# Patient Record
Sex: FEMALE | Race: WHITE | NOT HISPANIC OR LATINO | Employment: FULL TIME | URBAN - METROPOLITAN AREA
[De-identification: names, ages, dates, MRNs, and addresses within clinical notes are randomized per-mention and may not be internally consistent; named-entity substitution may affect disease eponyms.]

---

## 2023-07-25 ENCOUNTER — OFFICE VISIT (OUTPATIENT)
Dept: PRIMARY CARE | Facility: CLINIC | Age: 18
End: 2023-07-25
Payer: COMMERCIAL

## 2023-07-25 VITALS
HEIGHT: 65 IN | WEIGHT: 195.9 LBS | BODY MASS INDEX: 32.64 KG/M2 | SYSTOLIC BLOOD PRESSURE: 120 MMHG | RESPIRATION RATE: 16 BRPM | DIASTOLIC BLOOD PRESSURE: 86 MMHG | TEMPERATURE: 97.3 F | HEART RATE: 82 BPM

## 2023-07-25 DIAGNOSIS — R10.2 VAGINAL PAIN: Primary | ICD-10-CM

## 2023-07-25 PROCEDURE — 99213 OFFICE O/P EST LOW 20 MIN: CPT | Performed by: FAMILY MEDICINE

## 2023-07-25 PROCEDURE — 1036F TOBACCO NON-USER: CPT | Performed by: FAMILY MEDICINE

## 2023-07-25 ASSESSMENT — PATIENT HEALTH QUESTIONNAIRE - PHQ9
2. FEELING DOWN, DEPRESSED OR HOPELESS: NOT AT ALL
SUM OF ALL RESPONSES TO PHQ9 QUESTIONS 1 & 2: 0
1. LITTLE INTEREST OR PLEASURE IN DOING THINGS: NOT AT ALL

## 2023-07-25 NOTE — PROGRESS NOTES
"Subjective   Patient ID: Renetta Reece is a 17 y.o. female who presents for Vaginal Pain ([employer]pro[employer] 1 year white to clear occasional discharge).    Lower portion of vaginal has heaviness   Menses started yesterday   feels tampons   one finger uncomfortable  two cannot tolerable   Never sexually active    Tampons uncomfortable   feels tight pressure in the opening   menses started yesterday and has tampon in   Some discharge after menses but otherwise not much          Review of Systems    Objective   /86   Pulse 82   Temp 36.3 °C (97.3 °F) (Temporal)   Resp 16   Ht 1.651 m (5' 5\")   Wt 88.9 kg (195 lb 14.4 oz)   BMI 32.60 kg/m²     Physical Exam  Genitourinary:         Comments: Tenderness but unable to see well due to menses         Assessment/Plan   Problem List Items Addressed This Visit    None  Visit Diagnoses       Vaginal pain    -  Primary    Relevant Orders    Referral to Gynecology               "

## 2023-08-28 ENCOUNTER — OFFICE VISIT (OUTPATIENT)
Dept: PRIMARY CARE | Facility: CLINIC | Age: 18
End: 2023-08-28
Payer: COMMERCIAL

## 2023-08-28 VITALS
HEIGHT: 65 IN | BODY MASS INDEX: 32.19 KG/M2 | TEMPERATURE: 97.5 F | RESPIRATION RATE: 16 BRPM | HEART RATE: 76 BPM | WEIGHT: 193.2 LBS | DIASTOLIC BLOOD PRESSURE: 76 MMHG | SYSTOLIC BLOOD PRESSURE: 122 MMHG

## 2023-08-28 DIAGNOSIS — F41.9 ANXIETY: Primary | ICD-10-CM

## 2023-08-28 PROCEDURE — 99213 OFFICE O/P EST LOW 20 MIN: CPT | Performed by: FAMILY MEDICINE

## 2023-08-28 PROCEDURE — 1036F TOBACCO NON-USER: CPT | Performed by: FAMILY MEDICINE

## 2023-08-28 RX ORDER — ESCITALOPRAM OXALATE 5 MG/1
5 TABLET ORAL DAILY
Qty: 30 TABLET | Refills: 2 | Status: SHIPPED | OUTPATIENT
Start: 2023-08-28 | End: 2023-09-29 | Stop reason: SDUPTHER

## 2023-08-28 RX ORDER — NORGESTIMATE AND ETHINYL ESTRADIOL 7DAYSX3 LO
1 KIT ORAL DAILY
COMMUNITY
Start: 2020-02-27

## 2023-08-28 NOTE — PROGRESS NOTES
"Subjective   Patient ID: Renetta Reece is a 17 y.o. female who presents for Anxiety (Patient wants to discuss starting medication for her Anxiety. Patient states the anxiety is getting worse. ).    Has had anxiety since very young   When 5 yo took medication on top of ADHD medication study   Intuiv (guanfacine)  Stopped it at 10 yo due to stomach issues  Mom on Lexapro and doing well   Puts off doing thing because of being afraid          Review of Systems    Objective   /76   Pulse 76   Temp 36.4 °C (97.5 °F)   Resp 16   Ht 1.651 m (5' 5\")   Wt 87.6 kg (193 lb 3.2 oz)   BMI 32.15 kg/m²     Physical Exam  Vitals and nursing note reviewed.   Constitutional:       General: She is not in acute distress.     Appearance: She is not ill-appearing.   HENT:      Head: Normocephalic and atraumatic.      Mouth/Throat:      Mouth: Mucous membranes are moist.   Eyes:      Conjunctiva/sclera: Conjunctivae normal.   Cardiovascular:      Rate and Rhythm: Normal rate and regular rhythm.      Heart sounds: Normal heart sounds.   Pulmonary:      Effort: Pulmonary effort is normal.      Breath sounds: Normal breath sounds.   Skin:     General: Skin is warm.   Neurological:      Mental Status: She is alert.   Psychiatric:         Mood and Affect: Mood normal.         Thought Content: Thought content normal.         Judgment: Judgment normal.         Assessment/Plan   Problem List Items Addressed This Visit    None  Visit Diagnoses       Anxiety    -  Primary    Relevant Medications    escitalopram (Lexapro) 5 mg tablet    Other Relevant Orders    Follow Up In [facility] - PCP - Established               "

## 2023-09-29 ENCOUNTER — TELEMEDICINE (OUTPATIENT)
Dept: PRIMARY CARE | Facility: CLINIC | Age: 18
End: 2023-09-29
Payer: COMMERCIAL

## 2023-09-29 DIAGNOSIS — F41.9 ANXIETY: ICD-10-CM

## 2023-09-29 PROCEDURE — 99213 OFFICE O/P EST LOW 20 MIN: CPT | Performed by: FAMILY MEDICINE

## 2023-09-29 RX ORDER — ESCITALOPRAM OXALATE 5 MG/1
5 TABLET ORAL DAILY
Qty: 30 TABLET | Refills: 5 | Status: SHIPPED | OUTPATIENT
Start: 2023-09-29 | End: 2023-12-06 | Stop reason: SDUPTHER

## 2023-09-29 ASSESSMENT — ENCOUNTER SYMPTOMS
DEPRESSED MOOD: 0
NERVOUS/ANXIOUS: 0

## 2023-09-29 NOTE — PROGRESS NOTES
Subjective   Patient ID: Renetta Reece is a 18 y.o. female who presents for anxiety    Brain is much quit   Got new job and applied to go back to school to study interior design at [employer] and go in person,    no panic attacks  Medication helped think better and feels medication helped make recent decisions   Was able to go on roller coasters at    The visit was done using video and audio. Consent was given for the visit. Patient identity was confirmed. Time spent with patient was  11 minutes which included obtaining history, counselling and coordination of care, ordering and reviewing tests and documentation in medical records.  The patient was located at home     Anxiety  Presents for follow-up visit. Patient reports no depressed mood, excessive worry, nervous/anxious behavior or suicidal ideas.            Review of Systems   Psychiatric/Behavioral:  Negative for suicidal ideas. The patient is not nervous/anxious.        Objective   There were no vitals taken for this visit.    Physical Exam    Assessment/Plan   Problem List Items Addressed This Visit             ICD-10-CM    Anxiety F41.9    Relevant Medications    escitalopram (Lexapro) 5 mg tablet

## 2023-12-06 ENCOUNTER — OFFICE VISIT (OUTPATIENT)
Dept: PRIMARY CARE | Facility: CLINIC | Age: 18
End: 2023-12-06
Payer: COMMERCIAL

## 2023-12-06 VITALS
RESPIRATION RATE: 12 BRPM | TEMPERATURE: 97.1 F | HEART RATE: 60 BPM | DIASTOLIC BLOOD PRESSURE: 70 MMHG | BODY MASS INDEX: 33.8 KG/M2 | HEIGHT: 64 IN | SYSTOLIC BLOOD PRESSURE: 116 MMHG | WEIGHT: 198 LBS

## 2023-12-06 DIAGNOSIS — M25.9 ANKLE PROBLEM: ICD-10-CM

## 2023-12-06 DIAGNOSIS — Z00.00 ROUTINE GENERAL MEDICAL EXAMINATION AT A HEALTH CARE FACILITY: Primary | ICD-10-CM

## 2023-12-06 DIAGNOSIS — F41.9 ANXIETY: ICD-10-CM

## 2023-12-06 PROCEDURE — 99395 PREV VISIT EST AGE 18-39: CPT | Performed by: FAMILY MEDICINE

## 2023-12-06 PROCEDURE — 1036F TOBACCO NON-USER: CPT | Performed by: FAMILY MEDICINE

## 2023-12-06 RX ORDER — ESCITALOPRAM OXALATE 10 MG/1
10 TABLET ORAL DAILY
Qty: 30 TABLET | Refills: 1 | Status: SHIPPED | OUTPATIENT
Start: 2023-12-06

## 2023-12-06 NOTE — PROGRESS NOTES
"Subjective   Patient ID: Renetta Reece is a 18 y.o. female who presents for Annual Exam.    Subjective  Reason for Visit: Renetta Reece is an 18 y.o. female here for a Wellness visit.   Problems: Yes, describe: still has some anxiety     Past Medical, Surgical, and Family History reviewed and updated in chart.    Reviewed all medications by prescribing practitioner or clinical pharmacist (such as prescriptions, OTCs, herbal therapies and supplements) and documented in the medical record.      Menses regular: Yes  PAP: No  Sexually active: No  Dentist: Yes  Vitamins: No  Exercise:  Yes  Immunization up to date: No wants to wait on flu               Review of Systems    Objective   /70   Pulse 60   Temp 36.2 °C (97.1 °F) (Temporal)   Resp 12   Ht 1.626 m (5' 4\")   Wt 89.8 kg (198 lb)   BMI 33.99 kg/m²     Physical Exam  Vitals and nursing note reviewed.   Constitutional:       General: She is not in acute distress.     Appearance: She is not ill-appearing.   HENT:      Head: Normocephalic and atraumatic.      Mouth/Throat:      Mouth: Mucous membranes are moist.   Eyes:      Conjunctiva/sclera: Conjunctivae normal.   Cardiovascular:      Rate and Rhythm: Normal rate and regular rhythm.      Heart sounds: Normal heart sounds.   Pulmonary:      Effort: Pulmonary effort is normal.      Breath sounds: Normal breath sounds.   Skin:     General: Skin is warm.   Neurological:      Mental Status: She is alert.   Psychiatric:         Mood and Affect: Mood normal.         Thought Content: Thought content normal.         Judgment: Judgment normal.         Assessment/Plan   Problem List Items Addressed This Visit             ICD-10-CM    Anxiety F41.9    Relevant Medications    escitalopram (Lexapro) 10 mg tablet     Other Visit Diagnoses         Codes    Routine general medical examination at a health care facility    -  Primary Z00.00    Ankle problem     M25.9    Relevant Orders    Referral to Orthopaedic " Surgery          Problem List Items Addressed This Visit             ICD-10-CM    Anxiety F41.9    Relevant Medications    escitalopram (Lexapro) 10 mg tablet     Other Visit Diagnoses         Codes    Routine general medical examination at a health care facility    -  Primary Z00.00    Ankle problem     M25.9    Relevant Orders    Referral to Orthopaedic Surgery

## 2024-09-10 ASSESSMENT — ENCOUNTER SYMPTOMS
SHORTNESS OF BREATH: 0
CONSTITUTIONAL NEGATIVE: 1
WHEEZING: 0

## 2024-09-10 NOTE — PROGRESS NOTES
Subjective   Patient ID: Renetta Reece is a 19 y.o. female who presents for Follow-up.  Last physical: 12/6/23 dr byrne    new pt-osmani-last pcp, gi, ob gyn, others? Pcp- oscar byrne, therapist- nany sarabia at [facility]    Any forms to fill out? No   last labs- does not remember   is pt fasting? No   Does pt want a flu shot? No   last pap- never had one   Is pt taking the lexapro? Not currently; is discussing with therapist  Looks like it was prescribed for 2 mon in dec.  Any side effects? No   Any other questions or concerns that you want to discuss today? Wants refill on bcp    Family history form  Phq9=4  , gad7=10      HPI  Put in labs to do for physical in dec    Exercise-active at job; lifting and walking  Diet-1 meal and some snacking  water    No care team member to display     Review of Systems   Constitutional: Negative.    Respiratory:  Negative for shortness of breath and wheezing.    Cardiovascular:  Negative for chest pain.       Objective   Visit Vitals  /77   Pulse 82   Temp 37.1 °C (98.7 °F)      BP Readings from Last 3 Encounters:   09/11/24 115/77   12/06/23 116/70   08/28/23 122/76     Wt Readings from Last 3 Encounters:   09/11/24 94.6 kg (208 lb 9.6 oz)   12/06/23 89.8 kg (198 lb)   08/28/23 87.6 kg (193 lb 3.2 oz) (97%, Z= 1.83)*     * Growth percentiles are based on CDC (Girls, 2-20 Years) data.       Physical Exam  Constitutional:       General: She is not in acute distress.     Appearance: Normal appearance.   Cardiovascular:      Rate and Rhythm: Normal rate and regular rhythm.      Heart sounds: Normal heart sounds. No murmur heard.  Pulmonary:      Effort: Pulmonary effort is normal.      Breath sounds: Normal breath sounds. No wheezing, rhonchi or rales.   Neurological:      Mental Status: She is alert.         Assessment/Plan   Diagnoses and all orders for this visit:  Encounter to establish care  -     Referral to Obstetrics / Gynecology;  Future  Anxiety  Encounter for contraceptive management, unspecified type  -     norgestimate-ethinyl estradioL (Tri-Estarylla) 0.18/0.215/0.25 mg-35 mcg (28) tablet; Take 1 tablet by mouth once daily.  Other orders  -     Follow Up In Primary Care - Health Maintenance; Future

## 2024-09-11 ENCOUNTER — OFFICE VISIT (OUTPATIENT)
Dept: PRIMARY CARE | Facility: CLINIC | Age: 19
End: 2024-09-11
Payer: COMMERCIAL

## 2024-09-11 VITALS
BODY MASS INDEX: 35.61 KG/M2 | HEIGHT: 64 IN | SYSTOLIC BLOOD PRESSURE: 115 MMHG | HEART RATE: 82 BPM | WEIGHT: 208.6 LBS | OXYGEN SATURATION: 98 % | TEMPERATURE: 98.7 F | DIASTOLIC BLOOD PRESSURE: 77 MMHG

## 2024-09-11 DIAGNOSIS — Z76.89 ENCOUNTER TO ESTABLISH CARE: Primary | ICD-10-CM

## 2024-09-11 DIAGNOSIS — Z30.9 ENCOUNTER FOR CONTRACEPTIVE MANAGEMENT, UNSPECIFIED TYPE: ICD-10-CM

## 2024-09-11 DIAGNOSIS — Z00.00 ROUTINE GENERAL MEDICAL EXAMINATION AT A HEALTH CARE FACILITY: Primary | ICD-10-CM

## 2024-09-11 DIAGNOSIS — F41.9 ANXIETY: ICD-10-CM

## 2024-09-11 PROCEDURE — 99214 OFFICE O/P EST MOD 30 MIN: CPT | Performed by: NURSE PRACTITIONER

## 2024-09-11 PROCEDURE — 3008F BODY MASS INDEX DOCD: CPT | Performed by: NURSE PRACTITIONER

## 2024-09-11 PROCEDURE — 1036F TOBACCO NON-USER: CPT | Performed by: NURSE PRACTITIONER

## 2024-09-11 RX ORDER — ESCITALOPRAM OXALATE 10 MG/1
10 TABLET ORAL DAILY
Qty: 30 TABLET | Refills: 1 | Status: CANCELLED | OUTPATIENT
Start: 2024-09-11

## 2024-09-11 RX ORDER — NORGESTIMATE AND ETHINYL ESTRADIOL 7DAYSX3 28
1 KIT ORAL DAILY
Qty: 84 TABLET | Refills: 4 | Status: SHIPPED | OUTPATIENT
Start: 2024-09-11

## 2024-09-11 ASSESSMENT — ANXIETY QUESTIONNAIRES
6. BECOMING EASILY ANNOYED OR IRRITABLE: MORE THAN HALF THE DAYS
7. FEELING AFRAID AS IF SOMETHING AWFUL MIGHT HAPPEN: NOT AT ALL
2. NOT BEING ABLE TO STOP OR CONTROL WORRYING: MORE THAN HALF THE DAYS
1. FEELING NERVOUS, ANXIOUS, OR ON EDGE: MORE THAN HALF THE DAYS
IF YOU CHECKED OFF ANY PROBLEMS ON THIS QUESTIONNAIRE, HOW DIFFICULT HAVE THESE PROBLEMS MADE IT FOR YOU TO DO YOUR WORK, TAKE CARE OF THINGS AT HOME, OR GET ALONG WITH OTHER PEOPLE: SOMEWHAT DIFFICULT
3. WORRYING TOO MUCH ABOUT DIFFERENT THINGS: MORE THAN HALF THE DAYS
GAD7 TOTAL SCORE: 10
5. BEING SO RESTLESS THAT IT IS HARD TO SIT STILL: SEVERAL DAYS
4. TROUBLE RELAXING: SEVERAL DAYS

## 2024-09-11 ASSESSMENT — PATIENT HEALTH QUESTIONNAIRE - PHQ9
3. TROUBLE FALLING OR STAYING ASLEEP OR SLEEPING TOO MUCH: SEVERAL DAYS
6. FEELING BAD ABOUT YOURSELF - OR THAT YOU ARE A FAILURE OR HAVE LET YOURSELF OR YOUR FAMILY DOWN: SEVERAL DAYS
5. POOR APPETITE OR OVEREATING: SEVERAL DAYS
2. FEELING DOWN, DEPRESSED OR HOPELESS: NOT AT ALL
9. THOUGHTS THAT YOU WOULD BE BETTER OFF DEAD, OR OF HURTING YOURSELF: NOT AT ALL
4. FEELING TIRED OR HAVING LITTLE ENERGY: SEVERAL DAYS
8. MOVING OR SPEAKING SO SLOWLY THAT OTHER PEOPLE COULD HAVE NOTICED. OR THE OPPOSITE, BEING SO FIGETY OR RESTLESS THAT YOU HAVE BEEN MOVING AROUND A LOT MORE THAN USUAL: NOT AT ALL
7. TROUBLE CONCENTRATING ON THINGS, SUCH AS READING THE NEWSPAPER OR WATCHING TELEVISION: SEVERAL DAYS
10. IF YOU CHECKED OFF ANY PROBLEMS, HOW DIFFICULT HAVE THESE PROBLEMS MADE IT FOR YOU TO DO YOUR WORK, TAKE CARE OF THINGS AT HOME, OR GET ALONG WITH OTHER PEOPLE: SOMEWHAT DIFFICULT
1. LITTLE INTEREST OR PLEASURE IN DOING THINGS: NOT AT ALL
SUM OF ALL RESPONSES TO PHQ QUESTIONS 1-9: 5
SUM OF ALL RESPONSES TO PHQ9 QUESTIONS 1 AND 2: 0

## 2024-09-11 NOTE — PATIENT INSTRUCTIONS
See ob gyn for pap    Refill birth control    Let me know if what to restart lexapro or something else    You can use the lab in our building when fasting. The hrs are: Mon-Fri 7a-330p.  No Saturday hrs. (No Saturday hrs at [facility] either)  No appt needed, BUT YOU DO NEED THE PAPER ORDER.  OR   [place] Mon-FrI 7a-5p, Sat 8a-12p   [place] Mon-Fri 730a-4p, Sat  8a-12p   [facility] [address] Mon-Thurs 630a-6p , Fri 630a-4p, Sat 8a-12p  [facility] [address] Mon-Fri 7a-630p and Sat. 7a-3p    Fasting is no food, drink, gum or mints other than water for 12 hrs.   Results will be back in 2-3 business days for most labs. It is always recommended for any orders (labs, xrays, ultrasounds,MRI, ct scan, procedures etc) to check with your insurance provider for expected costs or expenses to you.       Exercise-cardio 4-5d/wk 30min each day  Diet-Breakfast-toast (my favorite Tiffanie Moses Delightful multi-grain and Nick's Killer Bread thin-sliced Good Seed)/bagel/English muffin-whole wheat flour as a 1st ingredient or cereal/oatmeal/granola bar-fiber 4g or more; protein like eggs or peanut butter is Ok  Lunch-protein, veg 1c  Dinner-protein, veg 1c; if having potatoes, rice, noodles, or pasta-->fist sized; could try brown rice or whole wheat pasta or quinoa  Fruit 2 a day  Dairy 2 a day-milk, almond milk, soy milk, yogurt, cottage cheese, yogurt  Snacks-Protein-hard boiled egg, nuts (walnuts/almonds/pecans/pistachios 1/4c), hummus, beef/deer jerky; vegetable, fruit, dairy-milk(1%, skim, almond, soy)/cheese (not a lot of cheddar)/yogurt (Greek is best-my favorite Dannon Fruit on the Bottom Greek)/cottage cheese 2%; triscuits, popcorn have a lot of fiber; serving size  Water  Limit alcohol to 2 drinks per day (1 drink=12oz beer or 5oz wine or 1 1/2oz liquor)  appt in   3 months; be fasting      I will communicate with you via [employer] regarding messages and results. If you need help with  this, you can call the support line at [phone].    IT WAS A PLEASURE TO SEE YOU TODAY. THANK YOU FOR CHOOSING US FOR YOUR HEALTHCARE NEEDS.

## 2024-09-20 ENCOUNTER — APPOINTMENT (OUTPATIENT)
Dept: PRIMARY CARE | Facility: CLINIC | Age: 19
End: 2024-09-20
Payer: COMMERCIAL

## 2024-09-25 ENCOUNTER — APPOINTMENT (OUTPATIENT)
Dept: PRIMARY CARE | Facility: CLINIC | Age: 19
End: 2024-09-25
Payer: COMMERCIAL

## 2024-12-11 ENCOUNTER — APPOINTMENT (OUTPATIENT)
Dept: PRIMARY CARE | Facility: CLINIC | Age: 19
End: 2024-12-11
Payer: COMMERCIAL

## 2024-12-11 ASSESSMENT — ENCOUNTER SYMPTOMS
POLYPHAGIA: 0
HEADACHES: 0
TROUBLE SWALLOWING: 0
EYE PAIN: 0
HALLUCINATIONS: 0
FEVER: 0
BACK PAIN: 0
NECK PAIN: 0
BRUISES/BLEEDS EASILY: 0
BLOOD IN STOOL: 0
WOUND: 0
UNEXPECTED WEIGHT CHANGE: 0
POLYDIPSIA: 0
FATIGUE: 0
DIZZINESS: 0
DYSURIA: 0
CHILLS: 0
HEMATURIA: 0
FREQUENCY: 0
COUGH: 0
EYE REDNESS: 0
APPETITE CHANGE: 0
ADENOPATHY: 0
PALPITATIONS: 0
CONFUSION: 0
VOMITING: 0
SHORTNESS OF BREATH: 0
SORE THROAT: 0
EYE DISCHARGE: 0
ABDOMINAL PAIN: 0

## 2024-12-11 NOTE — PROGRESS NOTES
"Subjective   Patient ID: Renetta Reece is a 19 y.o. female who presents for Annual Exam.      Is pt fasting? No  Does pt see any providers other than care team below:   Padmaja cornell    Did pt do labs from sept? No  Any forms to fill out? No  Does pt want flu shot? No  Did pt see ob gyn for pap? No  Any other questions or concerns that pt wants to discuss today? Wants to restart the Lexapro.    Phq9=7   , gad7=9        No care team member to display    HPI  Last labs->1yr  Due for labs- from sept    No results found for: \"CHOL\"  No results found for: \"TRIG\"  No results found for: \"HDL\"  No results found for: \"LDL\"  No results found for: \"TSH\"  No results found for: \"A1C\"  No components found for: \"POCA1C\"  No results found for: \"ALBUR\"  No components found for: \"POCALBUR\"      Other concerns:9/2024 phq9=4, today=7  9/2024 gad7=10, today=9  Wants to restart lexapro  1mon ago panic attack; hands locked up, hyperventilatin; could not drive, numbness in legs, elbows tingling  Would like to do therapy    Hemorrhoid for awhile    Gurgling; no stomach pain    bps at home- none    ER/urgicare visits in the last year- none  Hospitalizations in the last year- none      last Pap- due  H/o abn pap-n/a  Frequency-q28d  Duration-4-5d  Heavy periods-thruout  Abn uterine bleeding-none  Dysmenorrhea-yes  FH ovarian, cervical, uterine ca-none    Current birth control method-bcp  No change in contraception desired      FH br ca-none    FH colon ca-none    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    last eye dr appt- glasses lt rx; July 2024  No vision issues    last dental appt- 2mon ago    BMs-regular-qod  Sleep-able to fall asleep and stay asleep; no snoring or apnea  no cp, swelling, sob, abd pain, n/v/d/c, blood in stool or black stools  STI testing including hiv (age 15-65) and hep c screening (18-79)-decliines        Immunization " History   Administered Date(s) Administered    DTaP HepB IPV combined vaccine, pedatric (PEDIARIX) 2003, 01/13/2004    DTaP, Unspecified 2003, 01/13/2004, 03/10/2004, 03/10/2005, 09/16/2008    HPV 9-valent vaccine (GARDASIL 9) 10/30/2015    HPV, Quadrivalent 10/30/2015, 07/14/2017    Hepatitis B vaccine, 19 yrs and under (RECOMBIVAX, ENGERIX) 2003, 2003, 01/03/2004, 12/10/2004    HiB PRP-OMP conjugate vaccine, pediatric (PEDVAXHIB) 2003, 01/13/2004, 12/10/2004    HiB, unspecified 12/10/2009    Hib / Hep B 03/10/2004, 12/10/2004    Tdap vaccine, age 7 year and older (BOOSTRIX, ADACEL) 10/30/2015       Flu shot-declines      fractures in lifetime-5th toe  Anyone with osteoporosis in the family-none    FH heart attack, heart surgery-none  FH stroke-none    The ASCVD Risk score (Emigdio VENEGAS, et al., 2019) failed to calculate for the following reasons:    The 2019 ASCVD risk score is only valid for ages 40 to 79  Coronary Artery Calcium score:  This test is recommended for men 45 or older and women 55 or older without a history of heart disease and have 1 risk factor (high LDL cholesterol, low HDL cholesterol, high blood pressure, smoker (current or past), type 2 diabetes, IBD, lupus, RA, ankylosing spondylitis, psoriasis or family history of  heart disease <55yrs in dad, brother or child or <65yrs in mom, sister, or child.)       Review of Systems   Constitutional:  Negative for appetite change, chills, fatigue, fever and unexpected weight change.   HENT:  Negative for congestion, ear pain, sore throat and trouble swallowing.    Eyes:  Negative for pain, discharge and redness.   Respiratory:  Negative for cough and shortness of breath.    Cardiovascular:  Negative for chest pain and palpitations.   Gastrointestinal:  Negative for abdominal pain, blood in stool and vomiting.        Hemorrhoids   Endocrine: Negative for polydipsia, polyphagia and polyuria.   Genitourinary:  Negative for  dysuria, frequency, hematuria and urgency.   Musculoskeletal:  Negative for back pain and neck pain.   Skin:  Negative for rash and wound.   Allergic/Immunologic: Negative for immunocompromised state.   Neurological:  Negative for dizziness, syncope and headaches.   Hematological:  Negative for adenopathy. Does not bruise/bleed easily.   Psychiatric/Behavioral:  Positive for dysphoric mood. Negative for confusion and hallucinations.        Objective   Visit Vitals  /83   Pulse 72   Temp 37.1 °C (98.8 °F)      BP Readings from Last 3 Encounters:   12/12/24 116/83   09/11/24 115/77   12/06/23 116/70     Wt Readings from Last 3 Encounters:   12/12/24 91.1 kg (200 lb 12.8 oz)   09/11/24 94.6 kg (208 lb 9.6 oz)   12/06/23 89.8 kg (198 lb)           Physical Exam  Constitutional:       General: She is not in acute distress.     Appearance: Normal appearance. She is not ill-appearing.   HENT:      Head: Normocephalic.      Right Ear: Tympanic membrane, ear canal and external ear normal.      Left Ear: Tympanic membrane, ear canal and external ear normal.      Nose: Nose normal.      Mouth/Throat:      Mouth: Mucous membranes are moist.      Pharynx: Oropharynx is clear.   Eyes:      Extraocular Movements: Extraocular movements intact.      Conjunctiva/sclera: Conjunctivae normal.      Pupils: Pupils are equal, round, and reactive to light.   Cardiovascular:      Rate and Rhythm: Normal rate and regular rhythm.      Heart sounds: Normal heart sounds. No murmur heard.  Pulmonary:      Effort: Pulmonary effort is normal. No respiratory distress.      Breath sounds: Normal breath sounds. No wheezing, rhonchi or rales.   Abdominal:      General: Bowel sounds are normal.      Palpations: Abdomen is soft. There is no mass.      Tenderness: There is no abdominal tenderness.   Musculoskeletal:         General: No swelling or tenderness. Normal range of motion.      Cervical back: Normal range of motion and neck supple.       Right lower leg: No edema.      Left lower leg: No edema.   Skin:     General: Skin is warm.      Findings: No rash.   Neurological:      General: No focal deficit present.      Mental Status: She is alert and oriented to person, place, and time.      Cranial Nerves: No cranial nerve deficit.      Motor: No weakness.   Psychiatric:         Mood and Affect: Mood normal.         Behavior: Behavior normal.       Assessment/Plan   Diagnoses and all orders for this visit:  Routine general medical examination at a health care facility  Anxiety  -     Referral to Psychology; Future  -     escitalopram (Lexapro) 10 mg tablet; Take 1 tablet (10 mg) by mouth once daily.  BMI 34.0-34.9,adult  Hemorrhoids, unspecified hemorrhoid type  -     hydrocortisone (Anusol-HC) 2.5 % rectal cream; Apply to affected area twice a day  Mild episode of recurrent major depressive disorder (CMS-HCC)  -     hydrocortisone (Anusol-HC) 2.5 % rectal cream; Apply to affected area twice a day  Other orders  -     Follow Up In Primary Care - Health Maintenance      See patient instructions for full plan

## 2024-12-12 ENCOUNTER — OFFICE VISIT (OUTPATIENT)
Dept: PRIMARY CARE | Facility: CLINIC | Age: 19
End: 2024-12-12
Payer: COMMERCIAL

## 2024-12-12 VITALS
SYSTOLIC BLOOD PRESSURE: 116 MMHG | WEIGHT: 200.8 LBS | DIASTOLIC BLOOD PRESSURE: 83 MMHG | HEART RATE: 72 BPM | BODY MASS INDEX: 34.28 KG/M2 | TEMPERATURE: 98.8 F | HEIGHT: 64 IN

## 2024-12-12 DIAGNOSIS — K64.9 HEMORRHOIDS, UNSPECIFIED HEMORRHOID TYPE: ICD-10-CM

## 2024-12-12 DIAGNOSIS — F41.9 ANXIETY: ICD-10-CM

## 2024-12-12 DIAGNOSIS — Z00.00 ROUTINE GENERAL MEDICAL EXAMINATION AT A HEALTH CARE FACILITY: Primary | ICD-10-CM

## 2024-12-12 DIAGNOSIS — F33.0 MILD EPISODE OF RECURRENT MAJOR DEPRESSIVE DISORDER (CMS-HCC): ICD-10-CM

## 2024-12-12 PROCEDURE — 3008F BODY MASS INDEX DOCD: CPT | Performed by: NURSE PRACTITIONER

## 2024-12-12 PROCEDURE — 99213 OFFICE O/P EST LOW 20 MIN: CPT | Performed by: NURSE PRACTITIONER

## 2024-12-12 PROCEDURE — 1036F TOBACCO NON-USER: CPT | Performed by: NURSE PRACTITIONER

## 2024-12-12 PROCEDURE — 99395 PREV VISIT EST AGE 18-39: CPT | Performed by: NURSE PRACTITIONER

## 2024-12-12 RX ORDER — HYDROCORTISONE 25 MG/G
CREAM TOPICAL
Qty: 28 G | Refills: 2 | Status: SHIPPED | OUTPATIENT
Start: 2024-12-12

## 2024-12-12 RX ORDER — ESCITALOPRAM OXALATE 10 MG/1
10 TABLET ORAL DAILY
Qty: 30 TABLET | Refills: 1 | Status: SHIPPED | OUTPATIENT
Start: 2024-12-12

## 2024-12-12 ASSESSMENT — ANXIETY QUESTIONNAIRES
6. BECOMING EASILY ANNOYED OR IRRITABLE: SEVERAL DAYS
4. TROUBLE RELAXING: SEVERAL DAYS
3. WORRYING TOO MUCH ABOUT DIFFERENT THINGS: MORE THAN HALF THE DAYS
7. FEELING AFRAID AS IF SOMETHING AWFUL MIGHT HAPPEN: NOT AT ALL
IF YOU CHECKED OFF ANY PROBLEMS ON THIS QUESTIONNAIRE, HOW DIFFICULT HAVE THESE PROBLEMS MADE IT FOR YOU TO DO YOUR WORK, TAKE CARE OF THINGS AT HOME, OR GET ALONG WITH OTHER PEOPLE: SOMEWHAT DIFFICULT
GAD7 TOTAL SCORE: 9
5. BEING SO RESTLESS THAT IT IS HARD TO SIT STILL: SEVERAL DAYS
2. NOT BEING ABLE TO STOP OR CONTROL WORRYING: MORE THAN HALF THE DAYS
1. FEELING NERVOUS, ANXIOUS, OR ON EDGE: MORE THAN HALF THE DAYS

## 2024-12-12 ASSESSMENT — PATIENT HEALTH QUESTIONNAIRE - PHQ9
1. LITTLE INTEREST OR PLEASURE IN DOING THINGS: SEVERAL DAYS
3. TROUBLE FALLING OR STAYING ASLEEP OR SLEEPING TOO MUCH: SEVERAL DAYS
4. FEELING TIRED OR HAVING LITTLE ENERGY: SEVERAL DAYS
5. POOR APPETITE OR OVEREATING: SEVERAL DAYS
2. FEELING DOWN, DEPRESSED OR HOPELESS: NOT AT ALL
6. FEELING BAD ABOUT YOURSELF - OR THAT YOU ARE A FAILURE OR HAVE LET YOURSELF OR YOUR FAMILY DOWN: SEVERAL DAYS
7. TROUBLE CONCENTRATING ON THINGS, SUCH AS READING THE NEWSPAPER OR WATCHING TELEVISION: MORE THAN HALF THE DAYS
8. MOVING OR SPEAKING SO SLOWLY THAT OTHER PEOPLE COULD HAVE NOTICED. OR THE OPPOSITE, BEING SO FIGETY OR RESTLESS THAT YOU HAVE BEEN MOVING AROUND A LOT MORE THAN USUAL: NOT AT ALL
SUM OF ALL RESPONSES TO PHQ QUESTIONS 1-9: 7
10. IF YOU CHECKED OFF ANY PROBLEMS, HOW DIFFICULT HAVE THESE PROBLEMS MADE IT FOR YOU TO DO YOUR WORK, TAKE CARE OF THINGS AT HOME, OR GET ALONG WITH OTHER PEOPLE: SOMEWHAT DIFFICULT
9. THOUGHTS THAT YOU WOULD BE BETTER OFF DEAD, OR OF HURTING YOURSELF: NOT AT ALL
SUM OF ALL RESPONSES TO PHQ9 QUESTIONS 1 AND 2: 1

## 2024-12-12 ASSESSMENT — ENCOUNTER SYMPTOMS
ROS GI COMMENTS: HEMORRHOIDS
DYSPHORIC MOOD: 1

## 2024-12-12 NOTE — PATIENT INSTRUCTIONS
See ob gyn for pap  See therapist    Hemorrhoid rx cream    Restart lexapro  Call if sx change or worsen  Return in 3wks for follow up        Handouts given to pt:  physical handout        Labs- No appt needed:    You can use the lab in our building when fasting. The hrs are: Mon-Fri 7a-330p.  No Saturday hrs. Bring the paper order.   OR   [facility] Mon-Fri 7a-12p. No Saturday hrs. Bring the paper order.  OR   [facility] Mon-Fri 630a-530p or Sat 6:30a-12p. Bring the paper order  OR  [facility] Mon-FrI 7a-5p, Sat 8a-12p  [facility] Mon-Fri 730a-4p, Sat  8a-12p  [facility] [address] Mon-Thurs 630a-6p , Fri 630a-4p, Sat 8a-12p  [facility] [address] Mon-Fri 7a-630p and Sat. 7a-3p    Fasting is no food, drink, gum or mints other than water for 12 hrs.   Results will be back in 2-3 business days for most labs. It is always recommended for any orders (labs, xrays, ultrasounds,MRI, ct scan, procedures etc) to check with your insurance provider for expected costs or expenses to you.         You will get your results via phone from my medical assistant if you do not have MyChart.  OR  You will get your results via [employer]    If a result is urgent, I will call to speak to you.    Vaccines:  ---- flu vaccine-declines      General recommendations:  Exercise-cardio 4-5d/wk 30min each day  Diet-Breakfast-toast (my favorite Tiffanie Moses Delighful Multigrain or Nick's Killer Bread Good Seed thin-sliced)/bagel/English muffin-whole wheat flour as a 1st ingredient or cereal/oatmeal/granola bar-fiber 4g or more or protein like eggs or peanut butter; optional veggies  Lunch-protein, 1/2c carb or 2 slices bread, veg 1c  Dinner-protein, fist sized carb, veg 1c  Fruit 2 a day  Dairy 2 a day-milk, soy milk, almond milk, cheese, yogurt, cottage cheese  Snacks-Protein-hard boiled egg, nuts (walnuts/almonds/pecans/pistachios 1/4c), hummus, beef/deer jerky or meat sticks; vegetable,  fruit, dairy-milk(1%, skim, almond, soy)/cheese (not a lot of cheddar)/yogurt (Greek is best-my favorite Dannon Fruit on the Bottom Greek)/cottage cheese 2%; triscuits/ popcorn/wheat thins have a lot of fiber; follow serving size on bag/box/container  increase water  Limit alcohol to 1 drink per day for women and 2 drinks per day for men (1 drink=12oz beer or 5oz wine or 1 1/2oz liquor)  Calcium: 500mg 1 twice a day if age 50 and younger and 600mg 1 twice a day if over age 50 (calcium citrate can be taken without food)  Vitamin D: 800-5000 IU/day  Limit salt to <2300mg a day if age 50 and under and <1500mg a day if over age 50/have high bp or diabetes or kidney disease  Recommend folate for childbearing age women 0.4mg per day (can be found in a multivitamin)  Recommend 18mg/dL of iron a day if age 50 and under and 8mg/dL a day if over age 50; take on an empty stomach at bedtime  Use sunscreen   Wear seatbelt  Recommend safe sex practices: using condoms everytime you have sex, discuss with a new partner about their past partners/history of STDs/drug use, avoid drinking alcohol or using drugs as this increases the chance that you will participate in high-risk sex, for oral sex help protect your mouth by having your partner use a condom (male or female), women should not douche after sex, be aware of your partner's body and your body-look for signs of a sore, blister, rash, or discharge, and have regular exams and periodic tests for STDs.  No distracted driving  No driving when under influence of substances  Wear a seatbelt  Eye dr every 1-2yrs  Dentist every 6-12 mon  Tetanus shot every 10yrs  Recommend flu vaccine in the fall  Appt in 3wks for follow up on depression/anxiety and 1 year for physical      I will communicate with you via "[employer]" regarding messages and results. If you need help with this, you can call the support line at [phone].    IT WAS A PLEASURE TO SEE YOU TODAY. THANK YOU FOR CHOOSING US FOR  YOUR HEALTHCARE NEEDS.

## 2025-01-03 ASSESSMENT — ENCOUNTER SYMPTOMS
WHEEZING: 0
CONSTITUTIONAL NEGATIVE: 1
SHORTNESS OF BREATH: 0

## 2025-01-03 NOTE — PROGRESS NOTES
Subjective   Patient ID: Renetta Reece is a 19 y.o. female who presents for Follow-up.  Last physical: 12/12/24    Is pt fasting? Yes   Did she do the fasting labs?  No   Does pt want flu shot? No   Does pt want to update her tetanus shot? No   Last one in 2015  Did pt set up ob gyn appt for pap? No   Did pt set up a therapist appt? No   How is she doing on the lexapro? Does the lexapro need a dose adj?  Does not feel a whole lot different. Thinks might need more time. Didn't start taking til a week after last appt.   Any other questions or concerns that pt wants to discuss today?   No        HPI  12/19/24 pt restarted lexapro  12/12/24 phq9=7, today=11  12/12/24 gad7=9, today=7  7wks ago panic attack; hands locked up, hyperventilation; could not drive, numbness in legs, elbows tingling  No panic attacks since early dec but has felt like going to get one-ate hot candy and it stopped it  Therapist-did not make an appt  Does not feel a whole lot different on the lexapro. Thinks might need more time. Didn't start taking til a week after last appt.     No care team member to display     Review of Systems   Constitutional: Negative.    Respiratory:  Negative for shortness of breath and wheezing.    Cardiovascular:  Negative for chest pain.       Objective   Visit Vitals  /76   Pulse 85   Temp 36.4 °C (97.5 °F)      BP Readings from Last 3 Encounters:   01/06/25 108/76   12/12/24 116/83   09/11/24 115/77     Wt Readings from Last 3 Encounters:   01/06/25 91 kg (200 lb 9.6 oz)   12/12/24 91.1 kg (200 lb 12.8 oz)   09/11/24 94.6 kg (208 lb 9.6 oz)       Physical Exam  Constitutional:       General: She is not in acute distress.     Appearance: Normal appearance.   Neurological:      Mental Status: She is alert.       Assessment/Plan   Diagnoses and all orders for this visit:  Anxiety  -     Vitamin D 25-Hydroxy,Total (for eval of Vitamin D levels); Future  Mild episode of recurrent major depressive disorder  (CMS-HCC)  -     Vitamin D 25-Hydroxy,Total (for eval of Vitamin D levels); Future  Other orders  -     Follow Up In Primary Care - Established; Future        See patient instructions for full plan

## 2025-01-06 ENCOUNTER — APPOINTMENT (OUTPATIENT)
Dept: PRIMARY CARE | Facility: CLINIC | Age: 20
End: 2025-01-06
Payer: COMMERCIAL

## 2025-01-06 VITALS
DIASTOLIC BLOOD PRESSURE: 76 MMHG | HEIGHT: 64 IN | OXYGEN SATURATION: 97 % | BODY MASS INDEX: 34.25 KG/M2 | HEART RATE: 85 BPM | WEIGHT: 200.6 LBS | SYSTOLIC BLOOD PRESSURE: 108 MMHG | TEMPERATURE: 97.5 F

## 2025-01-06 DIAGNOSIS — F41.9 ANXIETY: Primary | ICD-10-CM

## 2025-01-06 DIAGNOSIS — F33.0 MILD EPISODE OF RECURRENT MAJOR DEPRESSIVE DISORDER (CMS-HCC): ICD-10-CM

## 2025-01-06 LAB
NON-UH HIE A/G RATIO: 1.3
NON-UH HIE ALB: 4.1 G/DL (ref 3.4–5)
NON-UH HIE ALK PHOS: 63 UNIT/L (ref 45–117)
NON-UH HIE BASO COUNT: 0.06 X1000 (ref 0–0.2)
NON-UH HIE BASOS %: 1.1 %
NON-UH HIE BILIRUBIN, TOTAL: 1.1 MG/DL (ref 0.3–1.2)
NON-UH HIE BUN/CREAT RATIO: 17.5
NON-UH HIE BUN: 14 MG/DL (ref 9–23)
NON-UH HIE CALCIUM: 9.7 MG/DL (ref 8.7–10.4)
NON-UH HIE CALCULATED LDL CHOLESTEROL: 145 MG/DL (ref 60–130)
NON-UH HIE CALCULATED OSMOLALITY: 277 MOSM/KG (ref 275–295)
NON-UH HIE CHLORIDE: 107 MMOL/L (ref 98–107)
NON-UH HIE CHOLESTEROL: 214 MG/DL (ref 100–200)
NON-UH HIE CO2, VENOUS: 27 MMOL/L (ref 20–31)
NON-UH HIE CREATININE: 0.8 MG/DL (ref 0.5–0.8)
NON-UH HIE DIFF?: NO
NON-UH HIE EOS COUNT: 0.08 X1000 (ref 0–0.5)
NON-UH HIE EOSIN %: 1.4 %
NON-UH HIE GFR AA: >60
NON-UH HIE GLOBULIN: 3.2 G/DL
NON-UH HIE GLOMERULAR FILTRATION RATE: >60 ML/MIN/1.73M?
NON-UH HIE GLUCOSE: 85 MG/DL (ref 74–106)
NON-UH HIE GOT: 17 UNIT/L (ref 15–37)
NON-UH HIE GPT: 27 UNIT/L (ref 10–49)
NON-UH HIE HCT: 41.2 % (ref 36–46)
NON-UH HIE HDL CHOLESTEROL: 42 MG/DL (ref 40–60)
NON-UH HIE HGB: 13.7 G/DL (ref 12–16)
NON-UH HIE INSTR WBC: 5.5
NON-UH HIE K: 4.1 MMOL/L (ref 3.5–5.1)
NON-UH HIE LYMPH %: 32.2 %
NON-UH HIE LYMPH COUNT: 1.78 X1000 (ref 1.2–4.8)
NON-UH HIE MCH: 29.6 PG (ref 27–34)
NON-UH HIE MCHC: 33.3 G/DL (ref 32–37)
NON-UH HIE MCV: 89.1 FL (ref 80–100)
NON-UH HIE MONO %: 10.4 %
NON-UH HIE MONO COUNT: 0.58 X1000 (ref 0.1–1)
NON-UH HIE MPV: 8.6 FL (ref 7.4–10.4)
NON-UH HIE NA: 139 MMOL/L (ref 135–145)
NON-UH HIE NEUTROPHIL %: 55 %
NON-UH HIE NEUTROPHIL COUNT (ANC): 3.04 X1000 (ref 1.4–8.8)
NON-UH HIE NUCLEATED RBC: 0 /100WBC
NON-UH HIE PLATELET: 313 X10 (ref 150–450)
NON-UH HIE RBC: 4.63 X10 (ref 4.2–5.4)
NON-UH HIE RDW: 12.7 % (ref 11.5–14.5)
NON-UH HIE TOTAL CHOL/HDL CHOL RATIO: 5.1
NON-UH HIE TOTAL PROTEIN: 7.3 G/DL (ref 5.7–8.2)
NON-UH HIE TRIGLYCERIDES: 133 MG/DL (ref 30–150)
NON-UH HIE TSH: 0.59 UIU/ML (ref 0.55–4.78)
NON-UH HIE VIT D 25: 5 NG/ML
NON-UH HIE WBC: 5.5 X10 (ref 4.5–11)

## 2025-01-06 PROCEDURE — 3008F BODY MASS INDEX DOCD: CPT | Performed by: NURSE PRACTITIONER

## 2025-01-06 PROCEDURE — 1036F TOBACCO NON-USER: CPT | Performed by: NURSE PRACTITIONER

## 2025-01-06 PROCEDURE — 99213 OFFICE O/P EST LOW 20 MIN: CPT | Performed by: NURSE PRACTITIONER

## 2025-01-06 ASSESSMENT — PATIENT HEALTH QUESTIONNAIRE - PHQ9
2. FEELING DOWN, DEPRESSED OR HOPELESS: SEVERAL DAYS
1. LITTLE INTEREST OR PLEASURE IN DOING THINGS: SEVERAL DAYS
7. TROUBLE CONCENTRATING ON THINGS, SUCH AS READING THE NEWSPAPER OR WATCHING TELEVISION: MORE THAN HALF THE DAYS
SUM OF ALL RESPONSES TO PHQ QUESTIONS 1-9: 11
5. POOR APPETITE OR OVEREATING: MORE THAN HALF THE DAYS
3. TROUBLE FALLING OR STAYING ASLEEP OR SLEEPING TOO MUCH: MORE THAN HALF THE DAYS
6. FEELING BAD ABOUT YOURSELF - OR THAT YOU ARE A FAILURE OR HAVE LET YOURSELF OR YOUR FAMILY DOWN: SEVERAL DAYS
9. THOUGHTS THAT YOU WOULD BE BETTER OFF DEAD, OR OF HURTING YOURSELF: NOT AT ALL
SUM OF ALL RESPONSES TO PHQ9 QUESTIONS 1 AND 2: 2
10. IF YOU CHECKED OFF ANY PROBLEMS, HOW DIFFICULT HAVE THESE PROBLEMS MADE IT FOR YOU TO DO YOUR WORK, TAKE CARE OF THINGS AT HOME, OR GET ALONG WITH OTHER PEOPLE: SOMEWHAT DIFFICULT
4. FEELING TIRED OR HAVING LITTLE ENERGY: SEVERAL DAYS
8. MOVING OR SPEAKING SO SLOWLY THAT OTHER PEOPLE COULD HAVE NOTICED. OR THE OPPOSITE, BEING SO FIGETY OR RESTLESS THAT YOU HAVE BEEN MOVING AROUND A LOT MORE THAN USUAL: SEVERAL DAYS

## 2025-01-06 ASSESSMENT — ANXIETY QUESTIONNAIRES
3. WORRYING TOO MUCH ABOUT DIFFERENT THINGS: SEVERAL DAYS
5. BEING SO RESTLESS THAT IT IS HARD TO SIT STILL: SEVERAL DAYS
1. FEELING NERVOUS, ANXIOUS, OR ON EDGE: SEVERAL DAYS
2. NOT BEING ABLE TO STOP OR CONTROL WORRYING: SEVERAL DAYS
IF YOU CHECKED OFF ANY PROBLEMS ON THIS QUESTIONNAIRE, HOW DIFFICULT HAVE THESE PROBLEMS MADE IT FOR YOU TO DO YOUR WORK, TAKE CARE OF THINGS AT HOME, OR GET ALONG WITH OTHER PEOPLE: SOMEWHAT DIFFICULT
4. TROUBLE RELAXING: SEVERAL DAYS
6. BECOMING EASILY ANNOYED OR IRRITABLE: MORE THAN HALF THE DAYS
GAD7 TOTAL SCORE: 7
7. FEELING AFRAID AS IF SOMETHING AWFUL MIGHT HAPPEN: NOT AT ALL

## 2025-01-06 NOTE — PATIENT INSTRUCTIONS
Set up ob gyn appt for pap  Set up therapist appt    Fasting labs today    Continue lexapro  Follow up in 1mon to see if need incr in med  Call if sx worsen or change      I will communicate with you via [employer] regarding messages and results. If you need help with this, you can call the support line at [phone].    IT WAS A PLEASURE TO SEE YOU TODAY. THANK YOU FOR CHOOSING US FOR YOUR HEALTHCARE NEEDS.

## 2025-01-07 DIAGNOSIS — E55.9 VITAMIN D DEFICIENCY: Primary | ICD-10-CM

## 2025-01-07 RX ORDER — ERGOCALCIFEROL 1.25 MG/1
1 CAPSULE ORAL
Qty: 5 CAPSULE | Refills: 2 | Status: SHIPPED | OUTPATIENT
Start: 2025-01-12 | End: 2025-04-21

## 2025-02-05 ASSESSMENT — ENCOUNTER SYMPTOMS
CONSTITUTIONAL NEGATIVE: 1
SHORTNESS OF BREATH: 0
WHEEZING: 0

## 2025-02-05 NOTE — PROGRESS NOTES
"Subjective   Patient ID: Renetta Reece \"Monique" is a 19 y.o. female who presents for Follow-up (Follow up for anxiety and depression).  Last physical: 12/12/24  Last labs-1/2025   Trigs normal.  Hdl low at 42. Goal >50 for women. Incr exercise.  Ldl high at 145. Goal <100. Decr fats and incr fiber.  TSH (thyroid test) was normal.  Vitamin D is very low. Goal is >30. Start Vitamin D prescription 1 a week. Prescription sent. Recheck lab in 2 months. No fasting needed.  This will be a lifetime medication.  Sugar (aka glucose), kidney function, liver function and electrolytes in the CMP (comprehensive metabolic panel) were normal.  CBC (complete blood count) was normal which looks at infection and anemia markers.    Has ob gyn appt 7/2/25    Does pt want flu shot? no  Does pt want to update her tetanus shot? No   Last one in 2015  Did pt set up a therapist appt? no  How is she doing on the lexapro? Much better Does the lexapro need a dose adj? Thinks to maybe increase to help with anxiety  Any other questions or concerns that pt wants to discuss today? no    Phq   , gad7    Mom here at pt request    HPI  12/19/24 pt restarted lexapro  12/12/24 phq9=7, 1/6/25=11,today=0  12/12/24 gad7=9, 1/6/25=7, today=2  2.5mon ago had panic attack; hands locked up, hyperventilation; could not drive, numbness in legs, elbows tingling  No panic attacks since early dec but has felt like going to get one-ate hot candy and it stopped it  Therapist-did not see yet  Didn't feel a whole lot different on lexapro in jan but had only been on it 2wks.  Currently helping depression but some anxiety still there.      No care team member to display     Review of Systems   Constitutional: Negative.    Respiratory:  Negative for shortness of breath and wheezing.    Cardiovascular:  Negative for chest pain.       Objective   Visit Vitals  /75   Pulse 75   Temp 36.3 °C (97.3 °F)        BP Readings from Last 3 Encounters:   02/06/25 103/75 "   01/06/25 108/76   12/12/24 116/83     Wt Readings from Last 3 Encounters:   01/06/25 91 kg (200 lb 9.6 oz)   12/12/24 91.1 kg (200 lb 12.8 oz)   09/11/24 94.6 kg (208 lb 9.6 oz)       Physical Exam  Constitutional:       General: She is not in acute distress.     Appearance: Normal appearance.   Neurological:      Mental Status: She is alert.       Assessment/Plan   Diagnoses and all orders for this visit:  Anxiety  -     escitalopram (Lexapro) 20 mg tablet; Take 1 tablet (20 mg) by mouth once daily.  Mild episode of recurrent major depressive disorder (CMS-HCC)  -     escitalopram (Lexapro) 20 mg tablet; Take 1 tablet (20 mg) by mouth once daily.  Vitamin D deficiency  Other orders  -     Follow Up In Primary Care - Established  -     Follow Up In Primary Care - Established; Future          See patient instructions for full plan

## 2025-02-06 ENCOUNTER — OFFICE VISIT (OUTPATIENT)
Dept: PRIMARY CARE | Facility: CLINIC | Age: 20
End: 2025-02-06
Payer: COMMERCIAL

## 2025-02-06 VITALS
OXYGEN SATURATION: 98 % | DIASTOLIC BLOOD PRESSURE: 75 MMHG | SYSTOLIC BLOOD PRESSURE: 103 MMHG | HEART RATE: 75 BPM | TEMPERATURE: 97.3 F

## 2025-02-06 DIAGNOSIS — E55.9 VITAMIN D DEFICIENCY: ICD-10-CM

## 2025-02-06 DIAGNOSIS — F41.9 ANXIETY: Primary | ICD-10-CM

## 2025-02-06 DIAGNOSIS — F33.0 MILD EPISODE OF RECURRENT MAJOR DEPRESSIVE DISORDER (CMS-HCC): ICD-10-CM

## 2025-02-06 PROCEDURE — 1036F TOBACCO NON-USER: CPT | Performed by: NURSE PRACTITIONER

## 2025-02-06 PROCEDURE — 99213 OFFICE O/P EST LOW 20 MIN: CPT | Performed by: NURSE PRACTITIONER

## 2025-02-06 RX ORDER — ESCITALOPRAM OXALATE 20 MG/1
20 TABLET ORAL DAILY
Qty: 90 TABLET | Refills: 2 | Status: SHIPPED | OUTPATIENT
Start: 2025-02-06 | End: 2025-11-03

## 2025-02-06 ASSESSMENT — PATIENT HEALTH QUESTIONNAIRE - PHQ9
4. FEELING TIRED OR HAVING LITTLE ENERGY: NOT AT ALL
5. POOR APPETITE OR OVEREATING: NOT AT ALL
2. FEELING DOWN, DEPRESSED OR HOPELESS: NOT AT ALL
1. LITTLE INTEREST OR PLEASURE IN DOING THINGS: NOT AT ALL
SUM OF ALL RESPONSES TO PHQ QUESTIONS 1-9: 0
9. THOUGHTS THAT YOU WOULD BE BETTER OFF DEAD, OR OF HURTING YOURSELF: NOT AT ALL
2. FEELING DOWN, DEPRESSED OR HOPELESS: NOT AT ALL
SUM OF ALL RESPONSES TO PHQ9 QUESTIONS 1 AND 2: 0
6. FEELING BAD ABOUT YOURSELF - OR THAT YOU ARE A FAILURE OR HAVE LET YOURSELF OR YOUR FAMILY DOWN: NOT AT ALL
SUM OF ALL RESPONSES TO PHQ9 QUESTIONS 1 AND 2: 0
7. TROUBLE CONCENTRATING ON THINGS, SUCH AS READING THE NEWSPAPER OR WATCHING TELEVISION: NOT AT ALL
1. LITTLE INTEREST OR PLEASURE IN DOING THINGS: NOT AT ALL
3. TROUBLE FALLING OR STAYING ASLEEP OR SLEEPING TOO MUCH: NOT AT ALL
8. MOVING OR SPEAKING SO SLOWLY THAT OTHER PEOPLE COULD HAVE NOTICED. OR THE OPPOSITE, BEING SO FIGETY OR RESTLESS THAT YOU HAVE BEEN MOVING AROUND A LOT MORE THAN USUAL: NOT AT ALL

## 2025-02-06 ASSESSMENT — ANXIETY QUESTIONNAIRES
1. FEELING NERVOUS, ANXIOUS, OR ON EDGE: SEVERAL DAYS
5. BEING SO RESTLESS THAT IT IS HARD TO SIT STILL: NOT AT ALL
4. TROUBLE RELAXING: NOT AT ALL
7. FEELING AFRAID AS IF SOMETHING AWFUL MIGHT HAPPEN: NOT AT ALL
2. NOT BEING ABLE TO STOP OR CONTROL WORRYING: NOT AT ALL
GAD7 TOTAL SCORE: 2
6. BECOMING EASILY ANNOYED OR IRRITABLE: SEVERAL DAYS
3. WORRYING TOO MUCH ABOUT DIFFERENT THINGS: NOT AT ALL

## 2025-02-06 NOTE — PATIENT INSTRUCTIONS
See therapist    Incr lexapro 20mg    Vitamin d lab in 1mon-no fasting or appt needed  Continue vit d rx    Return in 3wks.      I will communicate with you via [employer] regarding messages and results. If you need help with this, you can call the support line at [phone].    IT WAS A PLEASURE TO SEE YOU TODAY. THANK YOU FOR CHOOSING US FOR YOUR HEALTHCARE NEEDS.

## 2025-02-07 ENCOUNTER — APPOINTMENT (OUTPATIENT)
Dept: PRIMARY CARE | Facility: CLINIC | Age: 20
End: 2025-02-07
Payer: COMMERCIAL

## 2025-02-28 ENCOUNTER — APPOINTMENT (OUTPATIENT)
Dept: PRIMARY CARE | Facility: CLINIC | Age: 20
End: 2025-02-28
Payer: COMMERCIAL

## 2025-04-28 LAB
NON-UH HIE HEPATITIS B SURFACE ANTIBODY QUANT: <3.1
NON-UH HIE HEPATITIS B SURFACE ANTIBODY: NORMAL
NON-UH HIE RUBELLA SEROLOGY: NORMAL

## 2025-04-29 LAB
NON-UH HIE MUMPS ANTIBODY: NORMAL
NON-UH HIE RUBEOLA SCREEN: NORMAL
NON-UH HIE V. ZOSTER ANTIBODY: NORMAL

## 2025-05-01 LAB
NON-UH HIE QUANTIFERON MITOGEN MINUS NIL: 9.92 IU/ML
NON-UH HIE QUANTIFERON NIL: 0.08 IU/ML
NON-UH HIE QUANTIFERON PLUS TB1 MINUS NIL: 0.05 IU/ML
NON-UH HIE QUANTIFERON PLUS TB2 MINUS NIL: 0.05 IU/ML
NON-UH HIE QUANTIFERON TB GOLD PLUS: NEGATIVE

## 2025-07-02 ENCOUNTER — APPOINTMENT (OUTPATIENT)
Dept: OBSTETRICS AND GYNECOLOGY | Facility: CLINIC | Age: 20
End: 2025-07-02
Payer: COMMERCIAL